# Patient Record
Sex: MALE | Race: WHITE | NOT HISPANIC OR LATINO | Employment: FULL TIME | URBAN - METROPOLITAN AREA
[De-identification: names, ages, dates, MRNs, and addresses within clinical notes are randomized per-mention and may not be internally consistent; named-entity substitution may affect disease eponyms.]

---

## 2023-03-20 ENCOUNTER — APPOINTMENT (OUTPATIENT)
Dept: GENERAL RADIOLOGY | Facility: HOSPITAL | Age: 28
End: 2023-03-20
Payer: COMMERCIAL

## 2023-03-20 ENCOUNTER — HOSPITAL ENCOUNTER (EMERGENCY)
Facility: HOSPITAL | Age: 28
Discharge: HOME | End: 2023-03-20
Attending: EMERGENCY MEDICINE | Admitting: EMERGENCY MEDICINE
Payer: COMMERCIAL

## 2023-03-20 VITALS
TEMPERATURE: 99.7 F | DIASTOLIC BLOOD PRESSURE: 80 MMHG | HEART RATE: 85 BPM | RESPIRATION RATE: 16 BRPM | SYSTOLIC BLOOD PRESSURE: 128 MMHG | HEIGHT: 65 IN | WEIGHT: 140 LBS | BODY MASS INDEX: 23.32 KG/M2 | OXYGEN SATURATION: 96 %

## 2023-03-20 DIAGNOSIS — S39.012A ACUTE MYOFASCIAL STRAIN OF LUMBOSACRAL REGION, INITIAL ENCOUNTER: Primary | ICD-10-CM

## 2023-03-20 PROCEDURE — 99283 EMERGENCY DEPT VISIT LOW MDM: CPT

## 2023-03-20 PROCEDURE — 72110 X-RAY EXAM L-2 SPINE 4/>VWS: CPT

## 2023-03-20 RX ORDER — IBUPROFEN 800 MG/1
800 TABLET ORAL ONCE
Status: COMPLETED | OUTPATIENT
Start: 2023-03-20 | End: 2023-03-20

## 2023-03-20 RX ORDER — CYCLOBENZAPRINE HCL 10 MG
10 TABLET ORAL 3 TIMES DAILY PRN
Qty: 20 TABLET | Refills: 0 | Status: SHIPPED | OUTPATIENT
Start: 2023-03-20

## 2023-03-20 RX ADMIN — IBUPROFEN 800 MG: 800 TABLET, FILM COATED ORAL at 17:44

## 2023-03-20 NOTE — ED TRIAGE NOTES
Pt bent over to pick something up and feels he now has back pain    Patient was placed in face mask during first look triage.  Patient was wearing a face mask throughout encounter.  I wore personal protective equipment throughout the encounter.  Hand hygiene was performed before and after patient encounter.

## 2023-03-20 NOTE — ED PROVIDER NOTES
EMERGENCY DEPARTMENT ENCOUNTER    Room Number:  34/34  Date seen:  3/20/2023  PCP: Provider, No Known  Historian: Patient      HPI:  Chief Complaint: Low back pain  A complete HPI/ROS/PMH/PSH/SH/FH are unobtainable due to: Nothing  Context: Kel Tineo is a 27 y.o. male who presents to the ED c/o acute onset low back pain this afternoon.  He reports that he was changing the oxygen sensor on his car and he had dropped something on the ground.  He just went to bend over to pick it up and merely felt a sharp pain in his low back. Patient denies any recent trauma to back. No fevers. No history of illicit IV drug use. No chronic steroid use. No anticoagulants. No history of malignancy. No saddle anesthesia or bowel or bladder changes.                 PAST MEDICAL HISTORY  Active Ambulatory Problems     Diagnosis Date Noted   • No Active Ambulatory Problems     Resolved Ambulatory Problems     Diagnosis Date Noted   • No Resolved Ambulatory Problems     No Additional Past Medical History         PAST SURGICAL HISTORY  No past surgical history on file.      FAMILY HISTORY  No family history on file.      SOCIAL HISTORY  Social History     Socioeconomic History   • Marital status: Single         ALLERGIES  Patient has no known allergies.        REVIEW OF SYSTEMS  Review of Systems   Review of all 14 systems is negative other than stated in the HPI above.      PHYSICAL EXAM  ED Triage Vitals   Temp Heart Rate Resp BP SpO2   03/20/23 1633 03/20/23 1633 03/20/23 1633 03/20/23 1644 03/20/23 1633   99.7 °F (37.6 °C) 108 16 141/85 100 %      Temp src Heart Rate Source Patient Position BP Location FiO2 (%)   03/20/23 1633 03/20/23 1633 -- -- --   Tympanic Monitor          Physical Exam      GENERAL: Awake and alert, no acute distress  HENT: nares patent  EYES: no scleral icterus  CV: regular rhythm, normal rate  RESPIRATORY: normal effort  MUSCULOSKELETAL: no deformity.  Mild midline L-spine tenderness without step-off.  No  midline T-spine tenderness.  NEURO: alert, moves all extremities, follows commands. 5/5 strength with hip flexion, knee flex/ext, plantarflexion, and dorsiflexion of the feet BLE. Normal sensation to light touch and pin prick throughout BLE. 2+ DTR at the knee BLE.    PSYCH:  calm, cooperative  SKIN: warm, dry    Vital signs and nursing notes reviewed.          LAB RESULTS  No results found for this or any previous visit (from the past 24 hour(s)).    Ordered the above labs and reviewed the results.        RADIOLOGY  XR Spine Lumbar Complete 4+VW    Result Date: 3/20/2023  Lumbar spine radiograph  HISTORY:Acute low back pain after bending over  TECHNIQUE: AP, lateral, oblique and Spot views of the lumbar spine  COMPARISON:None      FINDINGS AND IMPRESSION: No fracture or malalignment is seen. No significant disc space narrowing is visualized.  This report was finalized on 3/20/2023 7:22 PM by Dr. Jarvis Nice M.D.        Ordered the above noted radiological studies. Reviewed by me in PACS.            PROCEDURES  Procedures              MEDICATIONS GIVEN IN ER  Medications   ibuprofen (ADVIL,MOTRIN) tablet 800 mg (800 mg Oral Given 3/20/23 1744)                   MEDICAL DECISION MAKING, PROGRESS, and CONSULTS    All labs have been independently reviewed by me.  All radiology studies have been reviewed by me and I have also reviewed the radiology report.   EKG's independently viewed and interpreted by me.  Discussion below represents my analysis of pertinent findings related to patient's condition, differential diagnosis, treatment plan and final disposition.        Orders placed during this visit:  Orders Placed This Encounter   Procedures   • XR Spine Lumbar Complete 4+VW           Differential diagnosis:    Lumbosacral strain  Lumbar disc herniation  Lumbar compression fracture        Independent interpretation of labs, radiology studies, and discussions with consultants:  ED Course as of 03/20/23 2322   Mon Mar  20, 2023 1854 Plain films of the lumbar spine independently interpreted in PACS.  There is some straightening of the lumbar lordosis, disc base is well-preserved, no acute fracture. [JR]      ED Course User Index  [JR] Angelo Macias MD     Patient appropriate for discharge home with anti-inflammatories and muscle relaxers as needed, PCP follow-up and return precautions.        I wore an N95 mask, face shield, and gloves during this patient encounter.  Patient also wearing a surgical mask.  Hand hygeine performed before and after seeing the patient.    DIAGNOSIS  Final diagnoses:   Acute myofascial strain of lumbosacral region, initial encounter         DISPOSITION  DISCHARGE    Patient discharged in stable condition.    Reviewed implications of results, diagnosis, meds, responsibility to follow up, warning signs and symptoms of possible worsening, potential complications and reasons to return to ER.    Patient/Family voiced understanding of above instructions.    Discussed plan for discharge, as there is no emergent indication for admission. Patient referred to primary care provider for BP management due to today's BP. Pt/family is agreeable and understands need for follow up and repeat testing.  Pt is aware that discharge does not mean that nothing is wrong but it indicates no emergency is present that requires admission and they must continue care with follow-up as given below or physician of their choice.     FOLLOW-UP  PATIENT CONNECTION - [place] [id number] [phone]  Call in 1 day           Medication List      New Prescriptions    cyclobenzaprine 10 MG tablet  Commonly known as: FLEXERIL  Take 1 tablet by mouth 3 (Three) Times a Day As Needed for Muscle Spasms.           Where to Get Your Medications      These medications were sent to [facility]/pharmacy #[id number] - [place], [place] - [address]  [phone] [facility] [phone]   [address] [facility] [address]     Phone: [phone]   · cyclobenzaprine 10 MG tablet                   Latest Documented Vital Signs:  As of 23:22 EDT  BP- 128/80 HR- 85 Temp- 99.7 °F (37.6 °C) (Tympanic) O2 sat- 96%              --    Please note that portions of this were completed with a voice recognition program.       Note Disclaimer: At [facility], we believe that sharing information builds trust and better relationships. You are receiving this note because you are receiving care at [facility] or recently visited. It is possible you will see health information before a provider has talked with you about it. This kind of information can be easy to misunderstand. To help you fully understand what it means for your health, we urge you to discuss this note with your provider.           Angelo Macias MD  03/20/23 8202

## 2025-05-10 ENCOUNTER — APPOINTMENT (OUTPATIENT)
Dept: CARDIOLOGY | Facility: HOSPITAL | Age: 30
End: 2025-05-10
Payer: COMMERCIAL

## 2025-05-10 ENCOUNTER — HOSPITAL ENCOUNTER (EMERGENCY)
Facility: HOSPITAL | Age: 30
Discharge: HOME | End: 2025-05-10
Attending: EMERGENCY MEDICINE
Payer: COMMERCIAL

## 2025-05-10 VITALS
BODY MASS INDEX: 24.11 KG/M2 | DIASTOLIC BLOOD PRESSURE: 78 MMHG | RESPIRATION RATE: 16 BRPM | OXYGEN SATURATION: 96 % | WEIGHT: 150 LBS | SYSTOLIC BLOOD PRESSURE: 112 MMHG | HEIGHT: 66 IN | HEART RATE: 70 BPM | TEMPERATURE: 98.8 F

## 2025-05-10 DIAGNOSIS — R42 LIGHTHEADED: Primary | ICD-10-CM

## 2025-05-10 DIAGNOSIS — R00.2 PALPITATIONS: ICD-10-CM

## 2025-05-10 DIAGNOSIS — Z72.0 NICOTINE USE: ICD-10-CM

## 2025-05-10 LAB
ALBUMIN SERPL-MCNC: 4.5 G/DL (ref 3.5–5.2)
ALBUMIN/GLOB SERPL: 1.6 G/DL
ALP SERPL-CCNC: 60 U/L (ref 39–117)
ALT SERPL W P-5'-P-CCNC: 26 U/L (ref 1–41)
AMPHET+METHAMPHET UR QL: NEGATIVE
AMPHETAMINES UR QL: NEGATIVE
ANION GAP SERPL CALCULATED.3IONS-SCNC: 10.3 MMOL/L (ref 5–15)
AST SERPL-CCNC: 31 U/L (ref 1–40)
BACTERIA UR QL AUTO: ABNORMAL /HPF
BARBITURATES UR QL SCN: NEGATIVE
BASOPHILS # BLD AUTO: 0.02 10*3/MM3 (ref 0–0.2)
BASOPHILS NFR BLD AUTO: 0.2 % (ref 0–1.5)
BENZODIAZ UR QL SCN: NEGATIVE
BILIRUB SERPL-MCNC: 0.6 MG/DL (ref 0–1.2)
BILIRUB UR QL STRIP: NEGATIVE
BUN SERPL-MCNC: 5 MG/DL (ref 6–20)
BUN/CREAT SERPL: 5.3 (ref 7–25)
BUPRENORPHINE SERPL-MCNC: NEGATIVE NG/ML
CALCIUM SPEC-SCNC: 9.5 MG/DL (ref 8.6–10.5)
CANNABINOIDS SERPL QL: NEGATIVE
CHLORIDE SERPL-SCNC: 103 MMOL/L (ref 98–107)
CLARITY UR: CLEAR
CO2 SERPL-SCNC: 26.7 MMOL/L (ref 22–29)
COCAINE UR QL: NEGATIVE
COLOR UR: YELLOW
CREAT SERPL-MCNC: 0.94 MG/DL (ref 0.76–1.27)
DEPRECATED RDW RBC AUTO: 42.6 FL (ref 37–54)
EGFRCR SERPLBLD CKD-EPI 2021: 113.9 ML/MIN/1.73
EOSINOPHIL # BLD AUTO: 0.28 10*3/MM3 (ref 0–0.4)
EOSINOPHIL NFR BLD AUTO: 3.3 % (ref 0.3–6.2)
ERYTHROCYTE [DISTWIDTH] IN BLOOD BY AUTOMATED COUNT: 12.2 % (ref 12.3–15.4)
ETHANOL BLD-MCNC: <10 MG/DL (ref 0–10)
ETHANOL UR QL: <0.01 %
FENTANYL UR-MCNC: NEGATIVE NG/ML
GLOBULIN UR ELPH-MCNC: 2.8 GM/DL
GLUCOSE SERPL-MCNC: 100 MG/DL (ref 65–99)
GLUCOSE UR STRIP-MCNC: NEGATIVE MG/DL
HCT VFR BLD AUTO: 43.9 % (ref 37.5–51)
HGB BLD-MCNC: 14.8 G/DL (ref 13–17.7)
HGB UR QL STRIP.AUTO: NEGATIVE
HYALINE CASTS UR QL AUTO: ABNORMAL /LPF
IMM GRANULOCYTES # BLD AUTO: 0.02 10*3/MM3 (ref 0–0.05)
IMM GRANULOCYTES NFR BLD AUTO: 0.2 % (ref 0–0.5)
KETONES UR QL STRIP: NEGATIVE
LEUKOCYTE ESTERASE UR QL STRIP.AUTO: ABNORMAL
LYMPHOCYTES # BLD AUTO: 2.24 10*3/MM3 (ref 0.7–3.1)
LYMPHOCYTES NFR BLD AUTO: 26.1 % (ref 19.6–45.3)
MAGNESIUM SERPL-MCNC: 2.1 MG/DL (ref 1.6–2.6)
MCH RBC QN AUTO: 31.4 PG (ref 26.6–33)
MCHC RBC AUTO-ENTMCNC: 33.7 G/DL (ref 31.5–35.7)
MCV RBC AUTO: 93 FL (ref 79–97)
METHADONE UR QL SCN: NEGATIVE
MONOCYTES # BLD AUTO: 0.67 10*3/MM3 (ref 0.1–0.9)
MONOCYTES NFR BLD AUTO: 7.8 % (ref 5–12)
NEUTROPHILS NFR BLD AUTO: 5.35 10*3/MM3 (ref 1.7–7)
NEUTROPHILS NFR BLD AUTO: 62.4 % (ref 42.7–76)
NITRITE UR QL STRIP: NEGATIVE
NRBC BLD AUTO-RTO: 0 /100 WBC (ref 0–0.2)
OPIATES UR QL: NEGATIVE
OXYCODONE UR QL SCN: NEGATIVE
PCP UR QL SCN: NEGATIVE
PH UR STRIP.AUTO: 7.5 [PH] (ref 5–8)
PHOSPHATE SERPL-MCNC: 2.9 MG/DL (ref 2.5–4.5)
PLATELET # BLD AUTO: 378 10*3/MM3 (ref 140–450)
PMV BLD AUTO: 9.7 FL (ref 6–12)
POTASSIUM SERPL-SCNC: 3.7 MMOL/L (ref 3.5–5.2)
PROT SERPL-MCNC: 7.3 G/DL (ref 6–8.5)
PROT UR QL STRIP: NEGATIVE
QT INTERVAL: 340 MS
QTC INTERVAL: 386 MS
RBC # BLD AUTO: 4.72 10*6/MM3 (ref 4.14–5.8)
RBC # UR STRIP: ABNORMAL /HPF
REF LAB TEST METHOD: ABNORMAL
SODIUM SERPL-SCNC: 140 MMOL/L (ref 136–145)
SP GR UR STRIP: 1.01 (ref 1–1.03)
SQUAMOUS #/AREA URNS HPF: ABNORMAL /HPF
TRICYCLICS UR QL SCN: NEGATIVE
UROBILINOGEN UR QL STRIP: ABNORMAL
WBC # UR STRIP: ABNORMAL /HPF
WBC NRBC COR # BLD AUTO: 8.58 10*3/MM3 (ref 3.4–10.8)

## 2025-05-10 PROCEDURE — 80307 DRUG TEST PRSMV CHEM ANLYZR: CPT | Performed by: PHYSICIAN ASSISTANT

## 2025-05-10 PROCEDURE — 82077 ASSAY SPEC XCP UR&BREATH IA: CPT | Performed by: PHYSICIAN ASSISTANT

## 2025-05-10 PROCEDURE — 99284 EMERGENCY DEPT VISIT MOD MDM: CPT

## 2025-05-10 PROCEDURE — 85025 COMPLETE CBC W/AUTO DIFF WBC: CPT | Performed by: PHYSICIAN ASSISTANT

## 2025-05-10 PROCEDURE — 93242 EXT ECG>48HR<7D RECORDING: CPT

## 2025-05-10 PROCEDURE — 84100 ASSAY OF PHOSPHORUS: CPT

## 2025-05-10 PROCEDURE — 93010 ELECTROCARDIOGRAM REPORT: CPT | Performed by: STUDENT IN AN ORGANIZED HEALTH CARE EDUCATION/TRAINING PROGRAM

## 2025-05-10 PROCEDURE — 80053 COMPREHEN METABOLIC PANEL: CPT | Performed by: PHYSICIAN ASSISTANT

## 2025-05-10 PROCEDURE — 83735 ASSAY OF MAGNESIUM: CPT

## 2025-05-10 PROCEDURE — 93005 ELECTROCARDIOGRAM TRACING: CPT | Performed by: PHYSICIAN ASSISTANT

## 2025-05-10 PROCEDURE — 25810000003 LACTATED RINGERS SOLUTION

## 2025-05-10 PROCEDURE — 81001 URINALYSIS AUTO W/SCOPE: CPT | Performed by: PHYSICIAN ASSISTANT

## 2025-05-10 RX ADMIN — SODIUM CHLORIDE, POTASSIUM CHLORIDE, SODIUM LACTATE AND CALCIUM CHLORIDE 1000 ML: 600; 310; 30; 20 INJECTION, SOLUTION INTRAVENOUS at 15:46

## 2025-05-10 NOTE — ED PROVIDER NOTES
EMERGENCY DEPARTMENT ENCOUNTER    Room Number:  06/06  Date of encounter:  5/10/2025  PCP: Provider, No Known  Historian: Patient  Full history not obtainable due to: None    HPI:  Chief Complaint: Lightheadedness    Context: Kel Tineo is a 29 y.o. male with a PMH significant for hypophosphatemia, asthma who presents to the ED c/o lightheadedness.  Patient says that he had an episode of lightheadedness along with facial flushing and a sensation of his heart racing on Wednesday.  This lasted for about 15 minutes, happened in the past in October and was related to having low phosphate level.  Patient says that since Wednesday, he has felt overall better but still feels lightheaded.  Denies any chest pain or shortness of breath.  Patient says lightheaded sensation is worse with positional changes.  Patient does admit to vaping, says he was vaping while in the hospital room today, also endorses a history of asthma.  States that he recently changed vape devices to a much larger device, is unsure if this has more nicotine content.     Patient's initial triage complaint was for dizziness, patient clarifies that he does not have any sensation of the room spinning around him, feels lightheaded, denies true dizziness      MEDICAL RECORD REVIEW:    Upon review of the medical record it appears the patient was evaluated 10/1/2024 fourth.  The patient had a normal magnesium and D-dimer.    PAST MEDICAL HISTORY    Active Ambulatory Problems     Diagnosis Date Noted    No Active Ambulatory Problems     Resolved Ambulatory Problems     Diagnosis Date Noted    No Resolved Ambulatory Problems     No Additional Past Medical History         PAST SURGICAL HISTORY  History reviewed. No pertinent surgical history.      FAMILY HISTORY  History reviewed. No pertinent family history.      SOCIAL HISTORY  Social History     Socioeconomic History    Marital status: Single   Vaping Use    Vaping status: Every Day    Substances: Nicotine    Substance and Sexual Activity    Alcohol use: Yes     Alcohol/week: 2.0 standard drinks of alcohol     Types: 2 Cans of beer per week    Drug use: Never    Sexual activity: Defer         ALLERGIES  Cephalexin        REVIEW OF SYSTEMS    All systems reviewed and marked as negative except as listed in HPI     PHYSICAL EXAM    I have reviewed the triage vital signs and nursing notes.    ED Triage Vitals   Temp Heart Rate Resp BP SpO2   05/10/25 1318 05/10/25 1318 05/10/25 1318 05/10/25 1344 05/10/25 1318   98.8 °F (37.1 °C) (!) 123 16 151/89 100 %      Temp src Heart Rate Source Patient Position BP Location FiO2 (%)   -- 05/10/25 1344 05/10/25 1344 05/10/25 1344 --    Monitor Sitting Left arm        Physical Exam  Constitutional:       General: He is not in acute distress.     Appearance: Normal appearance. He is not ill-appearing.   HENT:      Head: Normocephalic and atraumatic.      Nose: Nose normal.   Eyes:      Extraocular Movements: Extraocular movements intact.      Pupils: Pupils are equal, round, and reactive to light.   Cardiovascular:      Rate and Rhythm: Normal rate and regular rhythm.      Pulses: Normal pulses.      Heart sounds: Normal heart sounds.   Pulmonary:      Effort: Pulmonary effort is normal. No respiratory distress.      Breath sounds: Normal breath sounds.   Abdominal:      General: Abdomen is flat.      Palpations: Abdomen is soft.   Skin:     General: Skin is warm and dry.      Coloration: Skin is not jaundiced.   Neurological:      Mental Status: He is alert and oriented to person, place, and time.   Psychiatric:         Mood and Affect: Mood normal.         Behavior: Behavior normal.         Thought Content: Thought content normal.         Vital signs and nursing notes reviewed.            LAB RESULTS  Recent Results (from the past 24 hours)   Comprehensive Metabolic Panel    Collection Time: 05/10/25  2:39 PM    Specimen: Blood   Result Value Ref Range    Glucose 100 (H) 65 - 99 mg/dL     BUN 5 (L) 6 - 20 mg/dL    Creatinine 0.94 0.76 - 1.27 mg/dL    Sodium 140 136 - 145 mmol/L    Potassium 3.7 3.5 - 5.2 mmol/L    Chloride 103 98 - 107 mmol/L    CO2 26.7 22.0 - 29.0 mmol/L    Calcium 9.5 8.6 - 10.5 mg/dL    Total Protein 7.3 6.0 - 8.5 g/dL    Albumin 4.5 3.5 - 5.2 g/dL    ALT (SGPT) 26 1 - 41 U/L    AST (SGOT) 31 1 - 40 U/L    Alkaline Phosphatase 60 39 - 117 U/L    Total Bilirubin 0.6 0.0 - 1.2 mg/dL    Globulin 2.8 gm/dL    A/G Ratio 1.6 g/dL    BUN/Creatinine Ratio 5.3 (L) 7.0 - 25.0    Anion Gap 10.3 5.0 - 15.0 mmol/L    eGFR 113.9 >60.0 mL/min/1.73   CBC Auto Differential    Collection Time: 05/10/25  2:39 PM    Specimen: Blood   Result Value Ref Range    WBC 8.58 3.40 - 10.80 10*3/mm3    RBC 4.72 4.14 - 5.80 10*6/mm3    Hemoglobin 14.8 13.0 - 17.7 g/dL    Hematocrit 43.9 37.5 - 51.0 %    MCV 93.0 79.0 - 97.0 fL    MCH 31.4 26.6 - 33.0 pg    MCHC 33.7 31.5 - 35.7 g/dL    RDW 12.2 (L) 12.3 - 15.4 %    RDW-SD 42.6 37.0 - 54.0 fl    MPV 9.7 6.0 - 12.0 fL    Platelets 378 140 - 450 10*3/mm3    Neutrophil % 62.4 42.7 - 76.0 %    Lymphocyte % 26.1 19.6 - 45.3 %    Monocyte % 7.8 5.0 - 12.0 %    Eosinophil % 3.3 0.3 - 6.2 %    Basophil % 0.2 0.0 - 1.5 %    Immature Grans % 0.2 0.0 - 0.5 %    Neutrophils, Absolute 5.35 1.70 - 7.00 10*3/mm3    Lymphocytes, Absolute 2.24 0.70 - 3.10 10*3/mm3    Monocytes, Absolute 0.67 0.10 - 0.90 10*3/mm3    Eosinophils, Absolute 0.28 0.00 - 0.40 10*3/mm3    Basophils, Absolute 0.02 0.00 - 0.20 10*3/mm3    Immature Grans, Absolute 0.02 0.00 - 0.05 10*3/mm3    nRBC 0.0 0.0 - 0.2 /100 WBC   Ethanol    Collection Time: 05/10/25  2:39 PM    Specimen: Blood   Result Value Ref Range    Ethanol <10 0 - 10 mg/dL    Ethanol % <0.010 %   Magnesium    Collection Time: 05/10/25  2:39 PM    Specimen: Blood   Result Value Ref Range    Magnesium 2.1 1.6 - 2.6 mg/dL   Phosphorus    Collection Time: 05/10/25  2:39 PM    Specimen: Blood   Result Value Ref Range    Phosphorus 2.9 2.5 -  4.5 mg/dL   ECG 12 Lead Other; dizzy    Collection Time: 05/10/25  2:42 PM   Result Value Ref Range    QT Interval 340 ms    QTC Interval 386 ms   Urine Drug Screen - Urine, Clean Catch    Collection Time: 05/10/25  4:33 PM    Specimen: Urine, Clean Catch   Result Value Ref Range    THC, Screen, Urine Negative Negative    Phencyclidine (PCP), Urine Negative Negative    Cocaine Screen, Urine Negative Negative    Methamphetamine, Ur Negative Negative    Opiate Screen Negative Negative    Amphetamine Screen, Urine Negative Negative    Benzodiazepine Screen, Urine Negative Negative    Tricyclic Antidepressants Screen Negative Negative    Methadone Screen, Urine Negative Negative    Barbiturates Screen, Urine Negative Negative    Oxycodone Screen, Urine Negative Negative    Buprenorphine, Screen, Urine Negative Negative   Urinalysis With Microscopic If Indicated (No Culture) - Urine, Clean Catch    Collection Time: 05/10/25  4:33 PM    Specimen: Urine, Clean Catch   Result Value Ref Range    Color, UA Yellow Yellow, Straw    Appearance, UA Clear Clear    pH, UA 7.5 5.0 - 8.0    Specific Gravity, UA 1.009 1.005 - 1.030    Glucose, UA Negative Negative    Ketones, UA Negative Negative    Bilirubin, UA Negative Negative    Blood, UA Negative Negative    Protein, UA Negative Negative    Leuk Esterase, UA Trace (A) Negative    Nitrite, UA Negative Negative    Urobilinogen, UA 1.0 E.U./dL 0.2 - 1.0 E.U./dL   Fentanyl, Urine - Urine, Clean Catch    Collection Time: 05/10/25  4:33 PM    Specimen: Urine, Clean Catch   Result Value Ref Range    Fentanyl, Urine Negative Negative   Urinalysis, Microscopic Only - Urine, Clean Catch    Collection Time: 05/10/25  4:33 PM    Specimen: Urine, Clean Catch   Result Value Ref Range    RBC, UA None Seen None Seen, 0-2 /HPF    WBC, UA 3-5 (A) None Seen, 0-2 /HPF    Bacteria, UA None Seen None Seen /HPF    Squamous Epithelial Cells, UA None Seen None Seen, 0-2 /HPF    Hyaline Casts, UA None  Seen None Seen /LPF    Methodology Automated Microscopy        Ordered the above labs and independently reviewed the results.        RADIOLOGY  No Radiology Exams Resulted Within Past 24 Hours    I ordered the above noted radiological studies. Independently reviewed by me and discussed with radiologist.  See dictation above for official radiology interpretation.      MEDICATIONS GIVEN IN ER    Medications   lactated ringers bolus 1,000 mL (0 mL Intravenous Stopped 5/10/25 1936)         PROGRESS, DATA ANALYSIS, CONSULTS, AND MEDICAL DECISION MAKING    All labs have been independently interpreted by me.  All radiology studies have been interpreted by me.  Discussion below represents my analysis of pertinent findings related to patient's condition, differential diagnosis, treatment plan and final disposition.    Patient had an overall unremarkable workup in the emergency department.  I believe patient's symptoms are in part due to his use of nicotine and alcohol, patient continues to vape in the emergency department, causing his heart rate to increase.  Patient was encouraged to discontinue vaping and alcohol use, is sent home with a cardiac Holter monitor and follow-up to see a cardiologist.  Patient has normal phosphorus, normal magnesium, normal potassium, normal sodium.  Urine tox negative.    - Chronic or social conditions impacting care: None      DIFFERENTIAL DIAGNOSIS INCLUDE BUT NOT LIMITED TO: Differential diagnosis includes but is not limited to:  - Alcohol abuse, nicotine abuse, orthostatic hypotension, dehydration, electrolyte derangement, ACS          Orders placed during this visit:  Orders Placed This Encounter   Procedures    Comprehensive Metabolic Panel    CBC Auto Differential    Ethanol    Urine Drug Screen - Urine, Clean Catch    Urinalysis With Microscopic If Indicated (No Culture) - Urine, Clean Catch    Magnesium    Phosphorus    Fentanyl, Urine - Urine, Clean Catch    Urinalysis, Microscopic  Only - Urine, Clean Catch    Ambulatory Referral to Cardiology    Monitor Blood Pressure    Continuous Pulse Oximetry    Orthostatic Vitals    Holter Monitor - 72 Hour Up To 15 Days    ECG 12 Lead Other; dizzy    CBC & Differential         ED Course as of 05/10/25 2134   Sat May 10, 2025   1501 First Look -patient presents with dizziness.  Had an episode 3 days ago where he got flushed and hot, legs felt weak, lasted about 15 minutes.  Has not felt well since and had an episode to a lesser degree earlier today.  Denies chest pain and dyspnea.  Does not take any daily medications.  Drinks 2-4 alcoholic beverages per day.  Labs and EKG ordered [KA]   1758 Creatinine: 0.94 [MM]   1758 Ethanol %: <0.010 [MM]   1758 Magnesium: 2.1 [MM]      ED Course User Index  [KA] Lisette Stewart PA-C  [MM] Poli Wilkins MD       AS OF 21:34 EDT VITALS:    BP - 112/78  HR - 70  TEMP - 98.8 °F (37.1 °C)  02 SATS - 96%    I rechecked the patient.  I discussed the patient's labs, radiology findings (including all incidental findings), diagnosis, and plan for discharge.  A repeat exam reveals no new worrisome changes from my initial exam findings.  The patient understands that the fact that they are being discharged does not denote that nothing is abnormal, it indicates that no clinical emergency is present and that they must follow-up as directed in order to properly maintain their health.  Follow-up instructions (specifically listed below) and return to ER precautions were given at this time.  I specifically instructed the patient to follow-up with their PCP.  The patient understands and agrees with the plan, and is ready for discharge.  All questions answered.    PATIENT CONNECTION - [facility] [id number] [phone]  Call   To establish care with PCP    Airam Gregory MD  [facility] [place] [id number] [phone]    Call   As needed         Medication List      No changes were made to your  prescriptions during this visit.           DIAGNOSIS  Final diagnoses:   Palpitations   Lightheaded   Nicotine use         DISPOSITION  Discharge    Pt masked in first look. I wore a surgical mask throughout my encounters with the pt. I performed hand hygiene on entry into the pt room and upon exit.     Dictated utilizing Dragon dictation     Note Disclaimer: At [facility], we believe that sharing information builds trust and better relationships. You are receiving this note because you recently visited [facility]. It is possible you will see health information before a provider has talked with you about it. This kind of information can be easy to misunderstand. To help you fully understand what it means for your health, we urge you to discuss this note with your provider.      Leroy Mujica PA-C  05/10/25 9725

## 2025-05-10 NOTE — DISCHARGE INSTRUCTIONS
Please follow-up with your PCP.  Please stop vaping and decrease alcohol consumption.    Rest.  Increase fluid intake.     Although you are being discharged in the ED today, I encourage you to return for worsening symptoms.  Things can, and do, change such a treatment at home with medication may not be adequate.  Specifically I recommend returning for chest pain or discomfort, difficulty breathing, persistent vomiting or difficulty holding down liquids or medications, fever > 102.0 F, or any other worsening or alarming symptoms.     Take meds as prescribed.     You have been evaluated in the emergency department for your presenting symptoms and deemed appropriate for discharge home.  Understand that your health care does not end here today.  It is important that your primary care physician be made aware of your visit.  I recommend calling your primary care provider in the next 48 hours to arrange follow-up care.  Your primary care provider needs to review your treatment and recovery to ensure that no further treatment is necessary.  Additional testing or procedures may be necessary as an outpatient at the discretion of your primary care provider.    I also recommend following up with your PCP for recheck of your blood pressure and treatment as needed.

## 2025-05-10 NOTE — ED PROVIDER NOTES
I supervised care provided by the midlevel provider.   We have discussed this patient's history, physical exam, and treatment plan.  I have reviewed the note and personally saw and examined the patient and agree with the plan of care.   I have seen and evaluated this gentleman.  Significant other is present as well.  This gentleman had 1 other time several months ago similar symptoms as this.  He is very vague in his description is almost like dizziness or lightheadedness.  Is not like the room is spinning.  Is not like the sensation that he is demetrio pass out.  He notices it more when he quickly moves and he definitely notices it more when he goes to stand up.  But it does not occur all the time when he goes to stand up.  This gentleman does drink alcohol on a regular basis.  He drinks at least 2-3 beers daily and drinks more over the weekend.  He will even drink sometimes more on a daily basis.  He does vape.  He is recently increased his dosage and the amount that he vapes.  He does work third shift as well.  He does not sleep regularly and is sleep deprived.  He does complain of some generalized weakness and fatigue that has been longstanding that will vary in severity.  He does not really eat regular meals and he does not drink fluids regularly.  He denies any pain at all with these episodes denies any headache neck pain chest pain or abdominal pain.  The symptoms were more prominent on Wednesday.  When they will occur of the feelings they will vary in duration they might last a few minutes and sometimes last 15 to 20 minutes.  Nothing that he does that definitively resolves his symptoms.  Again he has no other neurologic symptoms with these episodes.  Is had no fevers chills coughs or colds.  He does report at night when he lays down he sometimes feels as if his heart skips a beat.  This is something this been occurring for several days if not longer.    GENERAL: not distressed.  When I see him his heart rate is  in the 70s his O2 sats 100% on room air he is afebrile and a normal blood pressure.  I did review his other vital signs where he had periods of tachycardia.  HENT: nares patent  Head/neck/ face are symmetric without gross deformity or swelling  EYES: no scleral icterus  CV: regular rhythm, regular rate with intact distal pulses no murmur or rub  RESPIRATORY: normal effort and no respiratory distress clear to auscultation bilaterally  ABDOMEN: soft and nontender  MUSCULOSKELETAL: no deformity  NEURO: alert and appropriate, moves all extremities, follows commands  SKIN: warm, dry    Vital signs and nursing notes reviewed.    Plan   ED Course as of 05/10/25 1913   Sat May 10, 2025   1501 First Look -patient presents with dizziness.  Had an episode 3 days ago where he got flushed and hot, legs felt weak, lasted about 15 minutes.  Has not felt well since and had an episode to a lesser degree earlier today.  Denies chest pain and dyspnea.  Does not take any daily medications.  Drinks 2-4 alcoholic beverages per day.  Labs and EKG ordered [KA]   1758 Creatinine: 0.94 [MM]   1758 Ethanol %: <0.010 [MM]   1758 Magnesium: 2.1 [MM]      ED Course User Index  [KA] Lisette Stewart PA-C  [MM] Poli Wilkins MD         It is really hard to know definitively what the etiology of his symptoms are.  His lab work and his symptoms do not sound emergent.  I suspect it could be multifactorial.  He is not sleeping regularly he drinks more alcohol than he should at least on a daily basis he is also has recently increased the amount of vaping that he does.  He does not eat regularly.  He is not orthostatic here but when he does stand up here and he does ambulate to the restroom he states that he felt a little lightheaded.  Informed him that he needs to stop drinking alcohol, stop vaping and to eat and drink regularly.  Also encouraged him to try to get at least 8 hours of sleep a night.  Will place a Holter monitor on him.  He is good to  be discharged home.         Poli Wilkins MD  05/10/25 [id number]

## 2025-05-20 LAB
CV ZIO BASELINE AVG BPM: 89
CV ZIO BASELINE BPM HIGH: 175
CV ZIO BASELINE BPM LOW: 51